# Patient Record
Sex: MALE | URBAN - METROPOLITAN AREA
[De-identification: names, ages, dates, MRNs, and addresses within clinical notes are randomized per-mention and may not be internally consistent; named-entity substitution may affect disease eponyms.]

---

## 2017-09-05 ENCOUNTER — EMERGENCY (EMERGENCY)
Facility: HOSPITAL | Age: 57
LOS: 1 days | Discharge: HOME | End: 2017-09-05
Attending: EMERGENCY MEDICINE | Admitting: EMERGENCY MEDICINE
Payer: COMMERCIAL

## 2017-09-05 VITALS
TEMPERATURE: 98 F | RESPIRATION RATE: 16 BRPM | OXYGEN SATURATION: 96 % | HEART RATE: 72 BPM | DIASTOLIC BLOOD PRESSURE: 74 MMHG | SYSTOLIC BLOOD PRESSURE: 114 MMHG

## 2017-09-05 VITALS
HEIGHT: 66 IN | HEART RATE: 88 BPM | OXYGEN SATURATION: 95 % | SYSTOLIC BLOOD PRESSURE: 141 MMHG | WEIGHT: 199.96 LBS | RESPIRATION RATE: 18 BRPM | DIASTOLIC BLOOD PRESSURE: 66 MMHG | TEMPERATURE: 99 F

## 2017-09-05 LAB
APPEARANCE UR: CLEAR — SIGNIFICANT CHANGE UP
BILIRUB UR-MCNC: NEGATIVE — SIGNIFICANT CHANGE UP
COLOR SPEC: YELLOW — SIGNIFICANT CHANGE UP
DIFF PNL FLD: NEGATIVE — SIGNIFICANT CHANGE UP
GLUCOSE UR QL: NEGATIVE — SIGNIFICANT CHANGE UP
KETONES UR-MCNC: NEGATIVE — SIGNIFICANT CHANGE UP
LEUKOCYTE ESTERASE UR-ACNC: NEGATIVE — SIGNIFICANT CHANGE UP
NITRITE UR-MCNC: NEGATIVE — SIGNIFICANT CHANGE UP
PH UR: 5 — SIGNIFICANT CHANGE UP (ref 5–8)
PROT UR-MCNC: NEGATIVE — SIGNIFICANT CHANGE UP
SP GR SPEC: 1.02 — SIGNIFICANT CHANGE UP (ref 1.01–1.02)
UROBILINOGEN FLD QL: NEGATIVE — SIGNIFICANT CHANGE UP

## 2017-09-05 PROCEDURE — 74176 CT ABD & PELVIS W/O CONTRAST: CPT | Mod: 26

## 2017-09-05 PROCEDURE — 81003 URINALYSIS AUTO W/O SCOPE: CPT

## 2017-09-05 PROCEDURE — 87086 URINE CULTURE/COLONY COUNT: CPT

## 2017-09-05 PROCEDURE — 71250 CT THORAX DX C-: CPT | Mod: 26

## 2017-09-05 PROCEDURE — 99284 EMERGENCY DEPT VISIT MOD MDM: CPT | Mod: 25

## 2017-09-05 PROCEDURE — 74176 CT ABD & PELVIS W/O CONTRAST: CPT

## 2017-09-05 PROCEDURE — 99283 EMERGENCY DEPT VISIT LOW MDM: CPT

## 2017-09-05 PROCEDURE — 71250 CT THORAX DX C-: CPT

## 2017-09-05 RX ORDER — NICOTINE POLACRILEX 2 MG
1 GUM BUCCAL ONCE
Refills: 0 | Status: DISCONTINUED | OUTPATIENT
Start: 2017-09-05 | End: 2017-09-09

## 2017-09-05 RX ORDER — LIDOCAINE 4 G/100G
1 CREAM TOPICAL ONCE
Refills: 0 | Status: COMPLETED | OUTPATIENT
Start: 2017-09-05 | End: 2017-09-05

## 2017-09-05 RX ORDER — OXYCODONE AND ACETAMINOPHEN 5; 325 MG/1; MG/1
1 TABLET ORAL ONCE
Refills: 0 | Status: DISCONTINUED | OUTPATIENT
Start: 2017-09-05 | End: 2017-09-05

## 2017-09-05 RX ORDER — DIAZEPAM 5 MG
1 TABLET ORAL
Qty: 12 | Refills: 0
Start: 2017-09-05 | End: 2017-09-09

## 2017-09-05 RX ORDER — DIAZEPAM 5 MG
5 TABLET ORAL ONCE
Refills: 0 | Status: DISCONTINUED | OUTPATIENT
Start: 2017-09-05 | End: 2017-09-05

## 2017-09-05 RX ORDER — LIDOCAINE 4 G/100G
1 CREAM TOPICAL
Qty: 30 | Refills: 0
Start: 2017-09-05 | End: 2017-10-05

## 2017-09-05 RX ADMIN — OXYCODONE AND ACETAMINOPHEN 1 TABLET(S): 5; 325 TABLET ORAL at 22:48

## 2017-09-05 RX ADMIN — OXYCODONE AND ACETAMINOPHEN 1 TABLET(S): 5; 325 TABLET ORAL at 20:35

## 2017-09-05 RX ADMIN — Medication 5 MILLIGRAM(S): at 20:36

## 2017-09-05 RX ADMIN — LIDOCAINE 1 PATCH: 4 CREAM TOPICAL at 22:47

## 2017-09-05 NOTE — ED PROVIDER NOTE - PLAN OF CARE
Return to the ED for any new or worsening symptom   Naproxen 1 tab 2 times a day for mild to moderate pain   Percocet per label instructions as needed for severe pain do not drive when taking  Valium per label instructions as needed for muscle spasm do not drive when taking  Lidoderm patch to affected sore back 1 patch daily  Incentive spirometry as described  Call the primary care physician tomorrow to schedule outpatient follow up  Heating pad to affected sore back on 20 min off 40 min as needed for pain   Advance activity as tolerated

## 2017-09-05 NOTE — ED PROVIDER NOTE - CARE PLAN
Principal Discharge DX:	Rib contusion, right, initial encounter  Instructions for follow-up, activity and diet:	Return to the ED for any new or worsening symptom   Naproxen 1 tab 2 times a day for mild to moderate pain   Percocet per label instructions as needed for severe pain do not drive when taking  Valium per label instructions as needed for muscle spasm do not drive when taking  Lidoderm patch to affected sore back 1 patch daily  Incentive spirometry as described  Call the primary care physician tomorrow to schedule outpatient follow up  Heating pad to affected sore back on 20 min off 40 min as needed for pain   Advance activity as tolerated  Secondary Diagnosis:	Flank pain, acute  Secondary Diagnosis:	Muscle strain of chest wall, initial encounter

## 2017-09-05 NOTE — ED ADULT NURSE NOTE - CHPI ED SYMPTOMS NEG
no bladder dysfunction/no bowel dysfunction/no constipation/no fatigue/no neck tenderness/no numbness/no tingling/no difficulty bearing weight/no motor function loss

## 2017-09-05 NOTE — ED PROVIDER NOTE - OBJECTIVE STATEMENT
Pt is a 57 yo male who presents to the ED with a cc of right lower posterior rib pain.  Pt reports that on Saturday night he was out dancing.  At some point in the night another dancer fell onto him causing him to fall.  He reports that the individual landed on his right side.  He reported pain over his right posterior rib region at that time but reports that symptoms have progressively worsened since the incident.  On Sunday he went to urgent care and was told that it was "just a bruise" after having an x-ray.  He was sent home with a prescription for Motrin.  Despite taking this the pain has continued to worsen.  He reports severe pain on movement, taking a deep breath just a the site of impact, and pain to touch.  He states that the pain is so severe it is difficult to move.  Pt denies striking his head at the time and is not on blood thinners.  Denies fever, chills, N/V/D/C, CP, SOB, abd pain, ext numbness or weakness.  Denies noting blood in his urine.

## 2017-09-05 NOTE — ED PROVIDER NOTE - MUSCULOSKELETAL, MLM
No midline C/T/L tenderness on exam no step offs or deformities, TTP right lower posterior and lateral ribs with no acute deformity no ecchymosis noted

## 2017-09-07 LAB
CULTURE RESULTS: NO GROWTH — SIGNIFICANT CHANGE UP
SPECIMEN SOURCE: SIGNIFICANT CHANGE UP
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
## 2017-09-08 DIAGNOSIS — S20.211A CONTUSION OF RIGHT FRONT WALL OF THORAX, INITIAL ENCOUNTER: ICD-10-CM

## 2017-09-08 DIAGNOSIS — Z88.0 ALLERGY STATUS TO PENICILLIN: ICD-10-CM
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
## 2017-09-08 DIAGNOSIS — S29.011A STRAIN OF MUSCLE AND TENDON OF FRONT WALL OF THORAX, INITIAL ENCOUNTER: ICD-10-CM

## 2017-09-08 DIAGNOSIS — R10.31 RIGHT LOWER QUADRANT PAIN: ICD-10-CM
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
## 2017-09-08 DIAGNOSIS — F17.210 NICOTINE DEPENDENCE, CIGARETTES, UNCOMPLICATED: ICD-10-CM
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
## 2017-09-08 DIAGNOSIS — M54.5 LOW BACK PAIN: ICD-10-CM

## 2017-12-28 ENCOUNTER — TRANSCRIPTION ENCOUNTER (OUTPATIENT)
Age: 57
End: 2017-12-28

## 2023-05-23 NOTE — ED ADULT TRIAGE NOTE - SOURCE OF INFORMATION
Patient [place] Pregnancy And Lactation Text: This medication is not safe during pregnancy. It is unknown if this medication is excreted in breast milk.